# Patient Record
Sex: FEMALE | Race: WHITE
[De-identification: names, ages, dates, MRNs, and addresses within clinical notes are randomized per-mention and may not be internally consistent; named-entity substitution may affect disease eponyms.]

---

## 2017-11-06 ENCOUNTER — HOSPITAL ENCOUNTER (OUTPATIENT)
Dept: HOSPITAL 17 - PHPRE | Age: 72
End: 2017-11-06
Attending: OBSTETRICS & GYNECOLOGY
Payer: COMMERCIAL

## 2017-11-06 DIAGNOSIS — N18.3: ICD-10-CM

## 2017-11-06 DIAGNOSIS — R10.2: ICD-10-CM

## 2017-11-06 DIAGNOSIS — Z01.812: Primary | ICD-10-CM

## 2017-11-06 DIAGNOSIS — N81.6: ICD-10-CM

## 2017-11-06 LAB
ANION GAP SERPL CALC-SCNC: 9 MEQ/L (ref 5–15)
BUN SERPL-MCNC: 21 MG/DL (ref 7–18)
CHLORIDE SERPL-SCNC: 106 MEQ/L (ref 98–107)
ERYTHROCYTE [DISTWIDTH] IN BLOOD BY AUTOMATED COUNT: 12 % (ref 11.6–17.2)
GFR SERPLBLD BASED ON 1.73 SQ M-ARVRAT: 37 ML/MIN (ref 89–?)
HCO3 BLD-SCNC: 24.3 MEQ/L (ref 21–32)
HCT VFR BLD CALC: 37.3 % (ref 35–46)
MCH RBC QN AUTO: 27.8 PG (ref 27–34)
MCHC RBC AUTO-ENTMCNC: 33.1 % (ref 32–36)
MCV RBC AUTO: 83.8 FL (ref 80–100)
PLATELET # BLD: 258 TH/MM3 (ref 150–450)
POTASSIUM SERPL-SCNC: 3.9 MEQ/L (ref 3.5–5.1)
RBC # BLD AUTO: 4.45 MIL/MM3 (ref 4–5.3)
REVIEW FLAG: (no result)
SODIUM SERPL-SCNC: 139 MEQ/L (ref 136–145)
WBC # BLD AUTO: 9.5 TH/MM3 (ref 4–11)

## 2017-11-06 PROCEDURE — 80048 BASIC METABOLIC PNL TOTAL CA: CPT

## 2017-11-06 PROCEDURE — 85027 COMPLETE CBC AUTOMATED: CPT

## 2017-11-06 PROCEDURE — 86920 COMPATIBILITY TEST SPIN: CPT

## 2017-11-06 PROCEDURE — 86901 BLOOD TYPING SEROLOGIC RH(D): CPT

## 2017-11-06 PROCEDURE — 36415 COLL VENOUS BLD VENIPUNCTURE: CPT

## 2017-11-06 PROCEDURE — 86900 BLOOD TYPING SEROLOGIC ABO: CPT

## 2017-11-06 PROCEDURE — 86850 RBC ANTIBODY SCREEN: CPT

## 2017-11-08 ENCOUNTER — HOSPITAL ENCOUNTER (OUTPATIENT)
Dept: HOSPITAL 17 - PHSDC | Age: 72
Setting detail: OBSERVATION
LOS: 1 days | Discharge: HOME | End: 2017-11-09
Attending: OBSTETRICS & GYNECOLOGY | Admitting: OBSTETRICS & GYNECOLOGY
Payer: COMMERCIAL

## 2017-11-08 VITALS
HEART RATE: 79 BPM | RESPIRATION RATE: 20 BRPM | SYSTOLIC BLOOD PRESSURE: 121 MMHG | TEMPERATURE: 98.4 F | DIASTOLIC BLOOD PRESSURE: 59 MMHG | OXYGEN SATURATION: 100 %

## 2017-11-08 VITALS
SYSTOLIC BLOOD PRESSURE: 130 MMHG | RESPIRATION RATE: 18 BRPM | DIASTOLIC BLOOD PRESSURE: 52 MMHG | HEART RATE: 86 BPM | TEMPERATURE: 96 F | OXYGEN SATURATION: 97 %

## 2017-11-08 VITALS
OXYGEN SATURATION: 96 % | DIASTOLIC BLOOD PRESSURE: 51 MMHG | RESPIRATION RATE: 18 BRPM | SYSTOLIC BLOOD PRESSURE: 107 MMHG | HEART RATE: 80 BPM | TEMPERATURE: 95.7 F

## 2017-11-08 VITALS — OXYGEN SATURATION: 99 %

## 2017-11-08 VITALS — HEIGHT: 63 IN | BODY MASS INDEX: 27.89 KG/M2 | WEIGHT: 157.41 LBS

## 2017-11-08 VITALS — OXYGEN SATURATION: 97 %

## 2017-11-08 DIAGNOSIS — N18.3: ICD-10-CM

## 2017-11-08 DIAGNOSIS — E11.22: ICD-10-CM

## 2017-11-08 DIAGNOSIS — N81.6: Primary | ICD-10-CM

## 2017-11-08 DIAGNOSIS — I25.10: ICD-10-CM

## 2017-11-08 DIAGNOSIS — K21.9: ICD-10-CM

## 2017-11-08 DIAGNOSIS — E03.9: ICD-10-CM

## 2017-11-08 PROCEDURE — G0378 HOSPITAL OBSERVATION PER HR: HCPCS

## 2017-11-08 RX ADMIN — SODIUM CHLORIDE ONE ML: 9 INJECTION, SOLUTION INTRAMUSCULAR; INTRAVENOUS; SUBCUTANEOUS at 07:18

## 2017-11-08 RX ADMIN — SODIUM CHLORIDE ONE ML: 9 INJECTION, SOLUTION INTRAMUSCULAR; INTRAVENOUS; SUBCUTANEOUS at 09:28

## 2017-11-08 RX ADMIN — OXYCODONE HYDROCHLORIDE AND ACETAMINOPHEN PRN TAB: 5; 325 TABLET ORAL at 15:08

## 2017-11-08 RX ADMIN — OXYCODONE HYDROCHLORIDE AND ACETAMINOPHEN PRN TAB: 5; 325 TABLET ORAL at 19:52

## 2017-11-08 RX ADMIN — OXYTOCIN PRN MLS/HR: 10 INJECTION, SOLUTION INTRAMUSCULAR; INTRAVENOUS at 08:35

## 2017-11-08 NOTE — PD.OP
__________________________________________________





Operative Report


Date of Surgery:  Nov 8, 2017


Preoperative Diagnosis:  


(1) Rectocele


Postoperative Diagnosis:  


(1) Rectocele


Procedure:


Rectocele repair with mesh graft


Anesthesia:


Dr. Liao/Payal general


Surgeon:


Barbara Vallejo


Assistant(s):


Rivera


Resident Surgeon:


n/a


Operation and Findings:


Indications:  Patient has a protruding rectocele which she would like repaired. 

She had a vaginal sacrocolpopexy in 2014 and a rectocele repair with allograft 

at that time. She felt the rectocele prolapse recently even though the 

sacrocolpopexy sutures are intact.





Procedure:  After induction of general anesthesia, the patient was positioned 

in low stirrups and prepped with placement of a Mullen catheter. An episiotomy 

was cut for visualization. Dilute vasopressin was injected submucosally for 

hemostasis and to develop planes. However, a transient rise in B/P caused me to 

not use vasopressin further during the case. Starting at the fourchette, the 

mucosa was  from the underlying tissues with a centerline incision 

from the fourchette to the cuff. With meticulous dissection the plane was 

carried to the lateral vaginal sulci bilaterally. The mesh was placed and cut 

to fit the defect. It was anchored in the middle at the cuff, then along the 

left sulcus in interrupted sutures. Checking to see if the mesh laid flat, it 

was then anchored at the right sulcus and at the fourchette. Redundant tissue 

was excised and discarded, followed by closure of the mucosa in the midline 

with a running, locked suture. At the perineum, the bodies of the transverse 

perinei and bulbocavernosi muscles were dissected enough to reapproximate these 

structures, thus recreating the perineal body. The midline suture was continued 

subcuticularly to the apex of the incision on the perineum. Careful rectal exam 

showed the rectum to be intact with no sutures into the rectal vault. Gauze 

packing lubricated with Premarin vaginal cream was used before returning the 

patient to a supine position. She was then awakened and transferred to the 

recovery room. Sponge, needle and instrument counts were correct.











Barbara Vallejo MD Nov 8, 2017 11:56

## 2017-11-09 VITALS
DIASTOLIC BLOOD PRESSURE: 58 MMHG | OXYGEN SATURATION: 100 % | SYSTOLIC BLOOD PRESSURE: 126 MMHG | TEMPERATURE: 98.9 F | RESPIRATION RATE: 18 BRPM | HEART RATE: 83 BPM

## 2017-11-09 VITALS
OXYGEN SATURATION: 99 % | RESPIRATION RATE: 12 BRPM | DIASTOLIC BLOOD PRESSURE: 73 MMHG | TEMPERATURE: 98.7 F | SYSTOLIC BLOOD PRESSURE: 149 MMHG | HEART RATE: 78 BPM

## 2017-11-09 VITALS
SYSTOLIC BLOOD PRESSURE: 141 MMHG | HEART RATE: 79 BPM | OXYGEN SATURATION: 96 % | RESPIRATION RATE: 12 BRPM | TEMPERATURE: 98.2 F | DIASTOLIC BLOOD PRESSURE: 65 MMHG

## 2017-11-09 VITALS
SYSTOLIC BLOOD PRESSURE: 142 MMHG | HEART RATE: 73 BPM | DIASTOLIC BLOOD PRESSURE: 73 MMHG | TEMPERATURE: 98.8 F | OXYGEN SATURATION: 98 % | RESPIRATION RATE: 14 BRPM

## 2017-11-09 VITALS — OXYGEN SATURATION: 96 %

## 2017-11-09 RX ADMIN — OXYTOCIN PRN MLS/HR: 10 INJECTION, SOLUTION INTRAMUSCULAR; INTRAVENOUS at 03:19

## 2017-11-09 RX ADMIN — OXYCODONE HYDROCHLORIDE AND ACETAMINOPHEN PRN TAB: 5; 325 TABLET ORAL at 03:16

## 2017-11-09 RX ADMIN — OXYCODONE HYDROCHLORIDE AND ACETAMINOPHEN PRN TAB: 5; 325 TABLET ORAL at 17:14

## 2017-11-09 RX ADMIN — OXYCODONE HYDROCHLORIDE AND ACETAMINOPHEN PRN TAB: 5; 325 TABLET ORAL at 10:49

## 2017-11-09 NOTE — HHI.DS
Discharge Summary


Admission Date


Nov 8, 2017 at 13:29


Discharge Date:  Nov 9, 2017


Admitting Diagnosis








(1) Rectocele


Diagnosis:  Principal


ICD Codes:  N81.6 - Rectocele


Procedures


Posterior colporrhaphy with mesh graft


Brief History


Patient with pelvic organ prolapse had surgery in 2014. Recently felt a 

recurrence of rectocele protruding and requested repair with mesh.


PE at Discharge


Patient ambulatory, voiding and tolerating regular diet.


Patient was more uncomfortable than typical for removal of packing, but packing 

had no fresh blood.


Hospital Course


Patient had a routine recovery overnight except for some low urine output that 

resolved with bolus.


Pt Condition on Discharge:  Good


Discharge Disposition:  Discharge Home


Discharge Instructions


DIET: Follow Instructions for:  As Tolerated, No Restrictions, Diabetic Diet


Activities you can perform:  Non Weight Bearing, Shower Only-No Bath, Pelvic 

Rest


Activities to avoid:  Strenuous Activity


Additional Information


Patient has post-op meds at home already. She has a follow up appointment 

scheduled for next week.











Barbara Vallejo MD Nov 9, 2017 16:59

## 2018-03-15 ENCOUNTER — HOSPITAL ENCOUNTER (OUTPATIENT)
Dept: HOSPITAL 17 - HRAD | Age: 73
End: 2018-03-15
Attending: FAMILY MEDICINE
Payer: MEDICARE

## 2018-03-15 DIAGNOSIS — J06.9: Primary | ICD-10-CM

## 2018-03-15 PROCEDURE — 71046 X-RAY EXAM CHEST 2 VIEWS: CPT

## 2018-03-15 NOTE — RADRPT
EXAM DATE/TIME:  03/15/2018 14:57 

 

[facility] COMPARISON:     

No previous studies available for comparison.

 

                     

INDICATIONS :     

Cough.

                     

 

MEDICAL HISTORY :     

None.          

 

SURGICAL HISTORY :     

None.   

 

ENCOUNTER:     

Initial                                        

 

ACUITY:     

2 months      

 

PAIN SCORE:     

0/10

 

LOCATION:     

Bilateral  Chest

 

FINDINGS:     

Bibasilar atelectasis and/or developing infiltrates are noted. The pulmonary vascular pattern is norm
al. The heart is normal. Degenerative changes are noted throughout the thoracic spine.

 

CONCLUSION:     

Bibasilar atelectasis and/or developing infiltrates. 

 

 

 Salomon Squires MD on March 15, 2018 at 15:08           

Board Certified Radiologist.

 This report was verified electronically.